# Patient Record
Sex: FEMALE | Race: WHITE | NOT HISPANIC OR LATINO | URBAN - METROPOLITAN AREA
[De-identification: names, ages, dates, MRNs, and addresses within clinical notes are randomized per-mention and may not be internally consistent; named-entity substitution may affect disease eponyms.]

---

## 2024-01-29 ENCOUNTER — EMERGENCY (EMERGENCY)
Facility: HOSPITAL | Age: 29
LOS: 1 days | Discharge: HOME | End: 2024-01-29
Admitting: EMERGENCY MEDICINE
Payer: COMMERCIAL

## 2024-01-29 VITALS
RESPIRATION RATE: 18 BRPM | SYSTOLIC BLOOD PRESSURE: 116 MMHG | HEART RATE: 88 BPM | DIASTOLIC BLOOD PRESSURE: 76 MMHG | WEIGHT: 134.92 LBS | OXYGEN SATURATION: 99 % | HEIGHT: 68 IN | TEMPERATURE: 98 F
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
## 2024-01-29 DIAGNOSIS — M79.644 PAIN IN RIGHT FINGER(S): ICD-10-CM

## 2024-01-29 DIAGNOSIS — S61.210A LACERATION WITHOUT FOREIGN BODY OF RIGHT INDEX FINGER WITHOUT DAMAGE TO NAIL, INITIAL ENCOUNTER: ICD-10-CM
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
## 2024-01-29 PROCEDURE — 99284 EMERGENCY DEPT VISIT MOD MDM: CPT | Mod: 25

## 2024-01-29 PROCEDURE — 12001 RPR S/N/AX/GEN/TRNK 2.5CM/<: CPT

## 2024-01-29 NOTE — ED PROVIDER NOTE - OBJECTIVE STATEMENT
28-year-old female presents this emergency room with pain on the tip of the right index finger after washing a knife that slipped.  This occurred about 1 hour prior to arrival.  Tetanus is up-to-date.  States that she almost catheter provide, however states "it is hanging by a little thread".

## 2024-01-29 NOTE — ED PROVIDER NOTE - PHYSICAL EXAMINATION
General: well developed, well nourished, no distress  Eyes: no scleral injection  Neck: non-tender, full range of motion, supple.   Respiratory: unlabored breathing  Cardiovascular: no central cyanosis  Musculoskeletal: normal gait.   Extremities: normal range of motion, non-tender.  Neurologic: alert, oriented to person, oriented to place, oriented to time.    Skin: normal color.  Negative For: rash  Partial skin avulsion appreciated the tip of the right index finger.  Neurovascular intact, cap refill less than 2 seconds.  Psychiatric: normal affect, normal insight, normal concentration

## 2024-01-29 NOTE — ED PROVIDER NOTE - NSFOLLOWUPINSTRUCTIONS_ED_ALL_ED_FT
Your Laceration Aftercare Instructions    A laceration, or cut, is an open wound through the skin.  These can be due to many different causes and can come in many different forms.  The depth, location, and cause of your wound, among multiple other factors (including your preference) effect the treatment choices we made today.    At the very least today, we’ve cleaned, assessed and dressed your wound.    Additional care you need depends on the type of cut or wound you have. I may have used stitches, staples, tape (like “steri-strips”), or skin glue (like “dermabond”) to close the wound. The main purposes of these treatments are to stop the bleeding, and to help the wound heal with reduced scarring.    How can you care for yourself at home?    Keep the wound clean and dry for the first 12-24 hours.  The wound has probably had a bandage applied (unless skin glue was used, or if the wound was in a hard-to-bandage area, like your hair).  You can remove the bandage after 12-24 hours at your convenience.  After this, no further dressings are necessary, unless I advised you differently today.    Dressing a Laceration  Example of dressing a laceration after repair. Image courtesy of [employer], under Creative [employer] (http://creativecommons.org/licenses/by-nc-sa/3.0/).  If your wound was sutured or stapled:    You can clean the area with a mild soap and water 2 times a day. Don’t use hydrogen peroxide, iodine-based solutions, or alcohol, which can slow healing, and will probably be painful!    You may cover the wound with a thin layer of antibiotic ointment, such as bacitracin or neosporin.  Be advised that some people can develop allergic skin rashes to topical antibiotics, especially neomycin (a component of neosporin) so you should avoid this if you have a known sensitivity.  You can continue to apply the antibiotic twice daily until the wound scars and dries out, or the sutures/staples are removed.    Sutures and staples should be removed within 3 days to 2 weeks, depending on where on your body they are located.  One exception is if absorbable sutures were used–these sometimes don’t require a visit for removal–I’ll advise you if this was the case.  The general rules of thumb for suture removal are as follows:    Optimal time frame for suture removal: 7-10 days  Keep in mind, specific situations related to your wound as well as your other medical conditions play in to the optimal removal time–this is just a guideline!

## 2024-01-29 NOTE — ED PROVIDER NOTE - PATIENT PORTAL LINK FT
You can access the FollowMyHealth Patient Portal offered by [facility] by registering at the following website: [email or web address]. By joining [employer]’s FollowMyHealth portal, you will also be able to view your health information using other applications (apps) compatible with our system.

## 2024-01-29 NOTE — ED ADULT NURSE NOTE - TEMPLATE
Wounds You can access the FollowMyHealth Patient Portal offered by [facility] by registering at the following website: [email or web address]. By joining [employer]’s FollowMyHealth portal, you will also be able to view your health information using other applications (apps) compatible with our system.

## 2024-01-29 NOTE — ED PROVIDER NOTE - CLINICAL SUMMARY MEDICAL DECISION MAKING FREE TEXT BOX
28 old female presents emerged from for a laceration to the right index finger  Partial skin appreciated.  Discussed with patient regarding sutures versus glue versus leaving it alone, patient is elected to do sutures  Sutures placed and patient struck to follow-up with primary care doctor  Consults with patient.  Patient understands and agrees with plan.  Agreed to follow primary care doctor in 2 to 3 days.

## 2024-02-07 ENCOUNTER — EMERGENCY (EMERGENCY)
Facility: HOSPITAL | Age: 29
LOS: 1 days | Discharge: HOME | End: 2024-02-07
Admitting: EMERGENCY MEDICINE
Payer: COMMERCIAL

## 2024-02-07 VITALS
HEART RATE: 88 BPM | OXYGEN SATURATION: 97 % | SYSTOLIC BLOOD PRESSURE: 110 MMHG | RESPIRATION RATE: 16 BRPM | HEIGHT: 68 IN | DIASTOLIC BLOOD PRESSURE: 76 MMHG | TEMPERATURE: 98 F

## 2024-02-07 DIAGNOSIS — S61.210D LACERATION WITHOUT FOREIGN BODY OF RIGHT INDEX FINGER WITHOUT DAMAGE TO NAIL, SUBSEQUENT ENCOUNTER: ICD-10-CM

## 2024-02-07 PROBLEM — Z78.9 OTHER SPECIFIED HEALTH STATUS: Chronic | Status: ACTIVE | Noted: 2024-01-29

## 2024-02-07 PROCEDURE — L9995: CPT

## 2024-02-07 NOTE — ED PROVIDER NOTE - PHYSICAL EXAMINATION
Healing laceration to the tip of right index finger with 6 simple interrupted sutures in place.  Healing no signs of infection Healing laceration to the tip of right index finger with 7 simple interrupted sutures in place.  Healing no signs of infection

## 2024-02-07 NOTE — ED PROVIDER NOTE - PATIENT PORTAL LINK FT
You can access the FollowMyHealth Patient Portal offered by [facility] by registering at the following website: [email or web address]. By joining [employer]’s FollowMyHealth portal, you will also be able to view your health information using other applications (apps) compatible with our system.

## 2024-02-07 NOTE — ED PROVIDER NOTE - OBJECTIVE STATEMENT
29-year-old female here for suture removal from right index finger.  Denies any acute complaints.  Appears well no acute distress

## 2024-02-07 NOTE — ED ADULT NURSE NOTE - IS THE PATIENT ABLE TO BE SCREENED?
[FreeTextEntry8] : Patient comes for an acute visit. \par \par She is here for evaluation of lower extremity edema. \par \par She noticed bilateral lower extremity since flying home from [place] last Sunday. She was vacationing in [place] and [place]. She thinks she noticed some edema during her trip but became more pronounced after returning home to [place]. The edema was symmetric bilaterally. She only got up once during her 8 hr flight to use bathroom. She often travels [place] and never has had an issue with edema. No hx of DVT. She is not on OCP. She is very active and ambulates a lot during her trips. She thinks she ate some high salty meals just prior to flying back home. She denies cp or SOB. She notes the edema has vastly dissipated in past few days.\par 
No